# Patient Record
Sex: MALE | Race: WHITE | NOT HISPANIC OR LATINO | Employment: OTHER | URBAN - NONMETROPOLITAN AREA
[De-identification: names, ages, dates, MRNs, and addresses within clinical notes are randomized per-mention and may not be internally consistent; named-entity substitution may affect disease eponyms.]

---

## 2023-07-25 ENCOUNTER — HOSPITAL ENCOUNTER (OUTPATIENT)
Dept: DATA CONVERSION | Facility: HOSPITAL | Age: 68
End: 2023-07-25
Attending: SURGERY | Admitting: SURGERY
Payer: MEDICARE

## 2023-07-25 DIAGNOSIS — Z12.11 ENCOUNTER FOR SCREENING FOR MALIGNANT NEOPLASM OF COLON: ICD-10-CM

## 2023-07-25 DIAGNOSIS — K62.1 RECTAL POLYP: ICD-10-CM

## 2023-07-25 DIAGNOSIS — K64.4 RESIDUAL HEMORRHOIDAL SKIN TAGS: ICD-10-CM

## 2023-08-02 LAB
COMPLETE PATHOLOGY REPORT: NORMAL
CONVERTED FINAL DIAGNOSIS: NORMAL
CONVERTED FINAL REPORT PDF LINK TO COPY AND PASTE: NORMAL
CONVERTED GROSS DESCRIPTION: NORMAL

## 2023-09-29 VITALS
TEMPERATURE: 98.1 F | HEART RATE: 53 BPM | WEIGHT: 185.19 LBS | HEIGHT: 73 IN | SYSTOLIC BLOOD PRESSURE: 136 MMHG | BODY MASS INDEX: 24.54 KG/M2 | RESPIRATION RATE: 16 BRPM | DIASTOLIC BLOOD PRESSURE: 83 MMHG

## 2023-09-30 NOTE — H&P
History of Present Illness:   History Present Illness:  Reason for surgery: screening   HPI:    68-year-old male patient here today for a screening colonoscopy.  Patient states his last colonoscopy was approximately 7 years ago and there were no abnormal findings  at that time.    Today: Patient has no complaints of or concerns today states he is just here for his screening, denies any BRBPR, abdominal pain or bloating, nausea, vomiting, fevers, chest pain, SOB.  Patient reports he typically has a soft formed BM daily without any  difficulties or use of stool softeners.    PMHx: Denies any significant    PSHx: Multiple laparoscopic surgeries to bilateral knees, laminectomy L4-L5 in 1997    Social history: Denies smoking, 2 alcoholic drinks per day    Family Hx: Denies any CRC        Allergies:        Allergies:  ·  NKDA :   ·  contrast  (specific type unknown): Hives/Urticaria    Home Medication Review:   Home Medications Reviewed: yes     Impression/Procedure:   ·  Impression and Planned Procedure: screening; colonoscopy       ERAS (Enhanced Recovery After Surgery):  ·  ERAS Patient: no     Review of Systems:   Review of Systems:  Constitutional: NEGATIVE: Fever, Chills, Anorexia,  Weight Loss, Malaise     Eyes: NEGATIVE: Blurry Vision, Drainage, Diploplia,  Redness, Vision Loss/ Change     ENMT: NEGATIVE: Nasal Discharge, Nasal Congestion,  Ear Pain, Mouth Pain, Throat Pain     Respiratory: NEGATIVE: Dry Cough, Productive Cough,  Hemoptysis, Wheezing, Shortness of Breath     Cardiac: NEGATIVE: Chest Pain, Dyspnea on Exertion,  Orthopnea, Palpitations, Syncope     Gastrointestinal: NEGATIVE: Nausea, Vomiting, Diarrhea,  Constipation, Abdominal Pain     Genitourinary: NEGATIVE: Discharge, Dysuria, Flank  Pain, Frequency, Hematuria     Musculoskeletal: NEGATIVE: Decreased ROM, Pain,  Swelling, Stiffness, Weakness     Neurological: NEGATIVE: Dizziness, Confusion, Headache,  Seizures, Syncope     Skin: NEGATIVE:  Rash     Hematologic/Lymph: NEGATIVE: Bruising, Easy Bleeding     All Other Systems: All other systems reviewed and  are negative       Vital Signs:  Temperature C: 36.7 degrees C   Temperature F: 98 degrees F   Heart Rate: 53 beats per minute   Respiratory Rate: 16 breath per minute   Blood Pressure Systolic: 136 mm/Hg   Blood Pressure Diastolic: 83 mm/Hg     Physical Exam by System:    Constitutional: Well developed, awake/alert/oriented  x3, no distress, alert and cooperative   Eyes: EOMI, clear sclera   ENMT: mucous membranes moist, no apparent injury,  no lesions seen   Head/Neck: Neck supple, no apparent injury   Respiratory/Thorax: Patent airways, CTAB, normal  breath sounds with good chest expansion, thorax symmetric   Cardiovascular: Regular, rate and rhythm, no murmurs,  2+ equal pulses of the extremities, normal S 1and S 2   Gastrointestinal: no obvious abdominal distention   Genitourinary: deferred   Musculoskeletal: GONZALES   Extremities: normal extremities, no cyanosis edema,  contusions or wounds, no clubbing, no ankle edema   Neurological: alert and oriented x3, intact senses,  motor, response and reflexes, normal strength   Psychological: Appropriate mood and behavior   Skin: Warm and dry, no lesions, no rashes     Consent:   COVID-19 Consent:  ·  COVID-19 Risk Consent Surgeon has reviewed key risks related to the risk of mike COVID-19 and if they contract COVID-19 what the risks are.       Electronic Signatures:  Nicolas Rosario)  (Signed 25-Jul-2023 09:21)   Co-Signer: History of Present Illness, Allergies, Home  Medication Review, Impression/Procedure, ERAS, Review of Systems, Physical Exam, Consent, Note Completion  Severino, Crystal (APRN-CNP)  (Signed 25-Jul-2023 07:59)   Authored: History of Present Illness, Allergies, Home  Medication Review, Impression/Procedure, ERAS, Review of Systems, Physical Exam, Consent, Note Completion      Last Updated: 25-Jul-2023 09:21 by Nicolas Rosario  (MD)

## 2024-03-11 ENCOUNTER — HOSPITAL ENCOUNTER (EMERGENCY)
Facility: HOSPITAL | Age: 69
Discharge: HOME | End: 2024-03-11
Payer: MEDICARE

## 2024-03-11 ENCOUNTER — APPOINTMENT (OUTPATIENT)
Dept: RADIOLOGY | Facility: HOSPITAL | Age: 69
End: 2024-03-11
Payer: MEDICARE

## 2024-03-11 ENCOUNTER — APPOINTMENT (OUTPATIENT)
Dept: CARDIOLOGY | Facility: HOSPITAL | Age: 69
End: 2024-03-11
Payer: MEDICARE

## 2024-03-11 VITALS
WEIGHT: 185 LBS | SYSTOLIC BLOOD PRESSURE: 139 MMHG | TEMPERATURE: 98.4 F | DIASTOLIC BLOOD PRESSURE: 76 MMHG | HEART RATE: 54 BPM | BODY MASS INDEX: 23.74 KG/M2 | RESPIRATION RATE: 16 BRPM | OXYGEN SATURATION: 96 % | HEIGHT: 74 IN

## 2024-03-11 DIAGNOSIS — M54.14 RADICULAR PAIN OF THORACIC REGION: Primary | ICD-10-CM

## 2024-03-11 LAB
ALBUMIN SERPL BCP-MCNC: 4.3 G/DL (ref 3.4–5)
ALP SERPL-CCNC: 71 U/L (ref 33–136)
ALT SERPL W P-5'-P-CCNC: 24 U/L (ref 10–52)
ANION GAP SERPL CALC-SCNC: 10 MMOL/L (ref 10–20)
AST SERPL W P-5'-P-CCNC: 20 U/L (ref 9–39)
BASOPHILS # BLD AUTO: 0.04 X10*3/UL (ref 0–0.1)
BASOPHILS NFR BLD AUTO: 0.8 %
BILIRUB SERPL-MCNC: 0.7 MG/DL (ref 0–1.2)
BUN SERPL-MCNC: 13 MG/DL (ref 6–23)
CALCIUM SERPL-MCNC: 9 MG/DL (ref 8.6–10.3)
CARDIAC TROPONIN I PNL SERPL HS: 5 NG/L (ref 0–20)
CARDIAC TROPONIN I PNL SERPL HS: 5 NG/L (ref 0–20)
CHLORIDE SERPL-SCNC: 105 MMOL/L (ref 98–107)
CO2 SERPL-SCNC: 30 MMOL/L (ref 21–32)
CREAT SERPL-MCNC: 0.92 MG/DL (ref 0.5–1.3)
D DIMER PPP FEU-MCNC: <215 NG/ML FEU
EGFRCR SERPLBLD CKD-EPI 2021: 90 ML/MIN/1.73M*2
EOSINOPHIL # BLD AUTO: 0.53 X10*3/UL (ref 0–0.7)
EOSINOPHIL NFR BLD AUTO: 10.1 %
ERYTHROCYTE [DISTWIDTH] IN BLOOD BY AUTOMATED COUNT: 14.6 % (ref 11.5–14.5)
GLUCOSE SERPL-MCNC: 83 MG/DL (ref 74–99)
HCT VFR BLD AUTO: 42.8 % (ref 41–52)
HGB BLD-MCNC: 13.8 G/DL (ref 13.5–17.5)
HOLD SPECIMEN: NORMAL
IMM GRANULOCYTES # BLD AUTO: 0.01 X10*3/UL (ref 0–0.7)
IMM GRANULOCYTES NFR BLD AUTO: 0.2 % (ref 0–0.9)
LYMPHOCYTES # BLD AUTO: 1.54 X10*3/UL (ref 1.2–4.8)
LYMPHOCYTES NFR BLD AUTO: 29.3 %
MCH RBC QN AUTO: 32.1 PG (ref 26–34)
MCHC RBC AUTO-ENTMCNC: 32.2 G/DL (ref 32–36)
MCV RBC AUTO: 100 FL (ref 80–100)
MONOCYTES # BLD AUTO: 0.58 X10*3/UL (ref 0.1–1)
MONOCYTES NFR BLD AUTO: 11 %
NEUTROPHILS # BLD AUTO: 2.56 X10*3/UL (ref 1.2–7.7)
NEUTROPHILS NFR BLD AUTO: 48.6 %
NRBC BLD-RTO: 0 /100 WBCS (ref 0–0)
PLATELET # BLD AUTO: 222 X10*3/UL (ref 150–450)
POTASSIUM SERPL-SCNC: 4.2 MMOL/L (ref 3.5–5.3)
PROT SERPL-MCNC: 7.1 G/DL (ref 6.4–8.2)
RBC # BLD AUTO: 4.3 X10*6/UL (ref 4.5–5.9)
SODIUM SERPL-SCNC: 141 MMOL/L (ref 136–145)
WBC # BLD AUTO: 5.3 X10*3/UL (ref 4.4–11.3)

## 2024-03-11 PROCEDURE — 36415 COLL VENOUS BLD VENIPUNCTURE: CPT | Performed by: EMERGENCY MEDICINE

## 2024-03-11 PROCEDURE — 85379 FIBRIN DEGRADATION QUANT: CPT | Performed by: EMERGENCY MEDICINE

## 2024-03-11 PROCEDURE — 84484 ASSAY OF TROPONIN QUANT: CPT | Performed by: EMERGENCY MEDICINE

## 2024-03-11 PROCEDURE — 84075 ASSAY ALKALINE PHOSPHATASE: CPT | Performed by: EMERGENCY MEDICINE

## 2024-03-11 PROCEDURE — 93005 ELECTROCARDIOGRAM TRACING: CPT

## 2024-03-11 PROCEDURE — 2500000001 HC RX 250 WO HCPCS SELF ADMINISTERED DRUGS (ALT 637 FOR MEDICARE OP): Performed by: EMERGENCY MEDICINE

## 2024-03-11 PROCEDURE — 71045 X-RAY EXAM CHEST 1 VIEW: CPT | Performed by: RADIOLOGY

## 2024-03-11 PROCEDURE — 85025 COMPLETE CBC W/AUTO DIFF WBC: CPT | Performed by: EMERGENCY MEDICINE

## 2024-03-11 PROCEDURE — 99283 EMERGENCY DEPT VISIT LOW MDM: CPT | Mod: 25

## 2024-03-11 PROCEDURE — 71045 X-RAY EXAM CHEST 1 VIEW: CPT

## 2024-03-11 RX ORDER — IBUPROFEN 600 MG/1
600 TABLET ORAL EVERY 8 HOURS PRN
Qty: 30 TABLET | Refills: 0 | Status: SHIPPED | OUTPATIENT
Start: 2024-03-11

## 2024-03-11 RX ORDER — NAPROXEN SODIUM 220 MG/1
324 TABLET, FILM COATED ORAL ONCE
Status: COMPLETED | OUTPATIENT
Start: 2024-03-11 | End: 2024-03-11

## 2024-03-11 RX ORDER — METOPROLOL TARTRATE 50 MG/1
25 TABLET ORAL ONCE
Status: COMPLETED | OUTPATIENT
Start: 2024-03-11 | End: 2024-03-11

## 2024-03-11 RX ADMIN — ASPIRIN 81 MG CHEWABLE TABLET 324 MG: 81 TABLET CHEWABLE at 10:57

## 2024-03-11 RX ADMIN — METOPROLOL TARTRATE 25 MG: 50 TABLET, FILM COATED ORAL at 10:58

## 2024-03-11 ASSESSMENT — PAIN DESCRIPTION - DESCRIPTORS
DESCRIPTORS: BURNING

## 2024-03-11 ASSESSMENT — PAIN SCALES - GENERAL
PAINLEVEL_OUTOF10: 4
PAINLEVEL_OUTOF10: 4

## 2024-03-11 ASSESSMENT — HEART SCORE
AGE: 65+
HEART SCORE: 3
HISTORY: SLIGHTLY SUSPICIOUS
TROPONIN: LESS THAN OR EQUAL TO NORMAL LIMIT
RISK FACTORS: NO KNOWN RISK FACTORS
ECG: NON-SPECIFIC REPOLARIZATION DISTURBANCE

## 2024-03-11 ASSESSMENT — PAIN DESCRIPTION - PAIN TYPE: TYPE: ACUTE PAIN

## 2024-03-11 ASSESSMENT — PAIN - FUNCTIONAL ASSESSMENT: PAIN_FUNCTIONAL_ASSESSMENT: 0-10

## 2024-03-11 ASSESSMENT — COLUMBIA-SUICIDE SEVERITY RATING SCALE - C-SSRS
6. HAVE YOU EVER DONE ANYTHING, STARTED TO DO ANYTHING, OR PREPARED TO DO ANYTHING TO END YOUR LIFE?: NO
2. HAVE YOU ACTUALLY HAD ANY THOUGHTS OF KILLING YOURSELF?: NO
1. IN THE PAST MONTH, HAVE YOU WISHED YOU WERE DEAD OR WISHED YOU COULD GO TO SLEEP AND NOT WAKE UP?: NO

## 2024-03-11 ASSESSMENT — PAIN DESCRIPTION - FREQUENCY: FREQUENCY: CONSTANT/CONTINUOUS

## 2024-03-11 NOTE — ED PROVIDER NOTES
[facility]   ED  Provider Note  3/11/2024 10:35 AM  AC07/AC07        History of Present Illness:   Raul Lake is a 68 y.o. male presenting to the ED for back and chest pain , beginning last Tuesday.  The complaint has been persistent, moderate in severity, and worsened by nothing.  Patient has sharp chest pain that begins in his back and radiates around the left side of his chest.  No particular palliative or exacerbating factors identified.  The pain has been mainly present since last Tuesday.  It is gradually worsened.  He denies any trauma or overuse.  He has no history of heart disease problem does not have diabetes hypertension high cholesterol and does not smoke.  There is no family history of heart disease.  He does not feel short of breath or nauseous.  He describes the pain is radiating around his back to the front of his chest.  He is concerned he may have zoster.  He has been immunized.      Review of Systems:   Pertinent positives and review of systems as noted above.  Remaining 10 review of systems is negative or noncontributory to today's episode of care.  Review of Systems       --------------------------------------------- PAST HISTORY ---------------------------------------------  Past Medical History:  has no past medical history on file.  Remote history of blunt chest trauma with rib fractures and traumatic pneumothorax, 0405 laminectomy, previous foot surgery    Past Surgical History:  has no past surgical history on file.    Social History:      Family History: family history is not on file. Unless otherwise noted, family history is non contributory    Patient's Medications   New Prescriptions    IBUPROFEN 600 MG TABLET    Take 1 tablet (600 mg) by mouth every 8 hours if needed for mild pain (1 - 3) or fever (temp greater than 38.0 C).   Previous Medications    No medications on file   Modified Medications    No medications on file   Discontinued Medications    No medications on file       The patient’s home medications have been reviewed.    Allergies: Patient has no known allergies.    -------------------------------------------------- RESULTS -------------------------------------------------  All laboratory and radiology results have been personally reviewed by myself   LABS:  Labs Reviewed   CBC WITH AUTO DIFFERENTIAL - Abnormal       Result Value    WBC 5.3      nRBC 0.0      RBC 4.30 (*)     Hemoglobin 13.8      Hematocrit 42.8            MCH 32.1      MCHC 32.2      RDW 14.6 (*)     Platelets 222      Neutrophils % 48.6      Immature Granulocytes %, Automated 0.2      Lymphocytes % 29.3      Monocytes % 11.0      Eosinophils % 10.1      Basophils % 0.8      Neutrophils Absolute 2.56      Immature Granulocytes Absolute, Automated 0.01      Lymphocytes Absolute 1.54      Monocytes Absolute 0.58      Eosinophils Absolute 0.53      Basophils Absolute 0.04     D-DIMER, VTE EXCLUSION - Normal    D-Dimer, Quantitative VTE Exclusion <215      Narrative:     The VTE Exclusion D-Dimer assay is reported in ng/mL Fibrinogen Equivalent Units (FEU).    Per 's instructions for use, a value of less than 500 ng/mL (FEU) may help to exclude DVT or PE in outpatients when the assay is used with a clinical pretest probability assessment.(AE must utilize and document eCalc 'Wells Score Deep Vein Thrombosis Risk' for DVT exclusion only. Emergency Department should utilize  Guidelines for Emergency Department Use of the VTE Exclusion D-Dimer and Clinical Pretest probability assessment model for DVT or PE exclusion.)   COMPREHENSIVE METABOLIC PANEL - Normal    Glucose 83      Sodium 141      Potassium 4.2      Chloride 105      Bicarbonate 30      Anion Gap 10      Urea Nitrogen 13      Creatinine 0.92      eGFR 90      Calcium 9.0      Albumin 4.3      Alkaline Phosphatase 71      Total Protein 7.1      AST 20      Bilirubin, Total 0.7      ALT 24     SERIAL TROPONIN-INITIAL - Normal     Troponin I, High Sensitivity 5      Narrative:     Less than 99th percentile of normal range cutoff-  Female and children under 18 years old <14 ng/L; Male <21 ng/L: Negative  Repeat testing should be performed if clinically indicated.     Female and children under 18 years old 14-50 ng/L; Male 21-50 ng/L:  Consistent with possible cardiac damage and possible increased clinical   risk. Serial measurements may help to assess extent of myocardial damage.     >50 ng/L: Consistent with cardiac damage, increased clinical risk and  myocardial infarction. Serial measurements may help assess extent of   myocardial damage.      NOTE: Children less than 1 year old may have higher baseline troponin   levels and results should be interpreted in conjunction with the overall   clinical context.     NOTE: Troponin I testing is performed using a different   testing methodology at [facility] than at other   [facility]. Direct result comparisons should only   be made within the same method.   TROPONIN SERIES- (INITIAL, 1 HR)    Narrative:     The following orders were created for panel order Troponin I Series, High Sensitivity (0, 1 HR).  Procedure                               Abnormality         Status                     ---------                               -----------         ------                     Troponin I, High Sensitiv...[[id number]]  Normal              Final result               Troponin, High Sensitivit...[[id number]]                                                   Please view results for these tests on the individual orders.   SERIAL TROPONIN, 1 HOUR     EKG: Sinus rhythm at 62 bpm, normal axis, incomplete right bundle branch block, no acute ST elevations.  Nonspecific ST changes are present.  Interpreted by RANI Vega MD no old EKGs available for comparison    RADIOLOGY:  Interpreted by Radiologist.  XR chest 1 view   Final Result   1.  No evidence of acute cardiopulmonary process.   2. Likely  subacute nondisplaced left posterior rib fractures.                  MACRO:   None        Signed by: Lidia Alvarez 3/11/2024 11:18 AM   Dictation workstation:   [id number]           No results found for this or any previous visit (from the past 4464 hour(s)).  ------------------------- NURSING NOTES AND VITALS REVIEWED ---------------------------   The nursing notes within the ED encounter and vital signs as below have been reviewed.   There were no vitals taken for this visit.  Oxygen Saturation Interpretation: Normal      ---------------------------------------------------PHYSICAL EXAM--------------------------------------  Physical Exam   Constitutional/General: Alert and oriented x3, well appearing, non toxic in NAD  Head: Normocephalic and atraumatic  Eyes: PERRL, EOMI, conjunctiva normal, sclera non icteric  Mouth: Oropharynx clear, handling secretions, no trismus, no asymmetry of the posterior oropharynx or uvular edema  Neck: Supple, full ROM, non tender to palpation in the midline, no stridor, no crepitus, no meningeal signs  Respiratory: Lungs clear to auscultation bilaterally, no wheezes, rales, or rhonchi. Not in respiratory distress  Cardiovascular:  Regular rate. Regular rhythm. No murmurs, gallops, or rubs. 2+ distal pulses  Chest: No chest wall tenderness  GI:  Abdomen Soft, Non tender, Non distended.  +BS. No organomegaly, no palpable masses,  No rebound, guarding, or rigidity.   Musculoskeletal: Moves all extremities x 4. Warm and well perfused, no clubbing, cyanosis, or edema. Capillary refill <3 seconds  Integument: skin warm and dry. No rashes.   Lymphatic: no lymphadenopathy noted  Neurologic: No focal deficits, symmetric strength 5/5 in the upper and lower extremities bilaterally  Psychiatric: Normal Affect    Procedures    ------------------------------ ED COURSE/MEDICAL DECISION MAKING----------------------  Diagnoses as of 03/11/24 1141   Radicular pain of thoracic region      Patient is  having pain in his left back rating around to his chest since last week Tuesday 7 days ago.  His EKG shows incomplete right bundle branch block with nonspecific ST changes.  His heart score is 3.  His troponin testing is negative.  Chest x-ray reveals old rib fractures are healed with no pneumothorax.  D-dimer testing is negative for PE.  His abdominal exam is benign.  He does not have CVA tenderness.  This appears radicular pain from his back or a possible herpetic infection without a rash.  Will manage his pain with ibuprofen and suggest outpatient follow-up with his primary care provider.  No acute findings suggesting hospitalization would be appropriate.      Medical Decision Making:   Discharged to home  Diagnoses as of 03/11/24 1141   Radicular pain of thoracic region      Counseling:   The emergency provider has spoken with the patient and discussed today’s results, in addition to providing specific details for the plan of care and counseling regarding the diagnosis and prognosis.  Questions are answered at this time and they are agreeable with the plan.      --------------------------------- IMPRESSION AND DISPOSITION ---------------------------------        IMPRESSION  1. Radicular pain of thoracic region        DISPOSITION  Disposition: Discharge to home  Patient condition is fair      Billing Provider Critical Care Time: 0 minutes     Naeem Vega MD  03/11/24 1142

## 2024-03-11 NOTE — DISCHARGE INSTRUCTIONS
Ibuprofen as prescribed for pain.    See your doctor in 1 week for recheck.    Return for worsening symptoms or concerns.

## 2024-03-16 LAB
ATRIAL RATE: 62 BPM
P AXIS: 86 DEGREES
P OFFSET: 203 MS
P ONSET: 144 MS
PR INTERVAL: 146 MS
Q ONSET: 217 MS
QRS COUNT: 10 BEATS
QRS DURATION: 102 MS
QT INTERVAL: 420 MS
QTC CALCULATION(BAZETT): 426 MS
QTC FREDERICIA: 424 MS
R AXIS: 67 DEGREES
T AXIS: 37 DEGREES
T OFFSET: 427 MS
VENTRICULAR RATE: 62 BPM

## 2025-08-19 ENCOUNTER — APPOINTMENT (OUTPATIENT)
Dept: RESPIRATORY THERAPY | Facility: HOSPITAL | Age: 70
End: 2025-08-19
Payer: MEDICARE

## 2025-08-25 ENCOUNTER — APPOINTMENT (OUTPATIENT)
Dept: RESPIRATORY THERAPY | Facility: HOSPITAL | Age: 70
End: 2025-08-25
Payer: MEDICARE

## 2025-08-25 DIAGNOSIS — J44.1 CHRONIC OBSTRUCTIVE PULMONARY DISEASE WITH (ACUTE) EXACERBATION (MULTI): ICD-10-CM

## 2025-08-25 LAB
MGC ASCENT PFT - FEV1 - PRE: 2.1
MGC ASCENT PFT - FEV1 - PREDICTED: 3.36
MGC ASCENT PFT - FVC - PRE: 5.06
MGC ASCENT PFT - FVC - PREDICTED: 4.51

## 2025-08-25 PROCEDURE — 94760 N-INVAS EAR/PLS OXIMETRY 1: CPT

## 2025-08-25 PROCEDURE — 94726 PLETHYSMOGRAPHY LUNG VOLUMES: CPT

## 2025-08-25 PROCEDURE — 94729 DIFFUSING CAPACITY: CPT | Performed by: PEDIATRICS

## 2025-08-25 PROCEDURE — 94726 PLETHYSMOGRAPHY LUNG VOLUMES: CPT | Performed by: PEDIATRICS

## 2025-08-25 PROCEDURE — 94729 DIFFUSING CAPACITY: CPT

## 2025-08-27 ENCOUNTER — TELEPHONE (OUTPATIENT)
Dept: PULMONOLOGY | Facility: CLINIC | Age: 70
End: 2025-08-27
Payer: MEDICARE